# Patient Record
Sex: FEMALE
[De-identification: names, ages, dates, MRNs, and addresses within clinical notes are randomized per-mention and may not be internally consistent; named-entity substitution may affect disease eponyms.]

---

## 2017-05-03 ENCOUNTER — TRANSCRIPTION ENCOUNTER (OUTPATIENT)
Age: 82
End: 2017-05-03

## 2017-05-11 ENCOUNTER — APPOINTMENT (OUTPATIENT)
Dept: SURGERY | Facility: CLINIC | Age: 82
End: 2017-05-11

## 2017-05-11 VITALS
TEMPERATURE: 97.8 F | OXYGEN SATURATION: 96 % | HEIGHT: 63 IN | SYSTOLIC BLOOD PRESSURE: 136 MMHG | DIASTOLIC BLOOD PRESSURE: 68 MMHG | WEIGHT: 163 LBS | BODY MASS INDEX: 28.88 KG/M2 | HEART RATE: 50 BPM

## 2017-05-11 PROBLEM — Z00.00 ENCOUNTER FOR PREVENTIVE HEALTH EXAMINATION: Status: ACTIVE | Noted: 2017-05-11

## 2017-05-25 ENCOUNTER — OUTPATIENT (OUTPATIENT)
Dept: OUTPATIENT SERVICES | Facility: HOSPITAL | Age: 82
LOS: 1 days | End: 2017-05-25
Payer: COMMERCIAL

## 2017-05-25 VITALS — HEIGHT: 63 IN | WEIGHT: 162.92 LBS

## 2017-05-25 DIAGNOSIS — C50.912 MALIGNANT NEOPLASM OF UNSPECIFIED SITE OF LEFT FEMALE BREAST: ICD-10-CM

## 2017-05-25 DIAGNOSIS — Z01.818 ENCOUNTER FOR OTHER PREPROCEDURAL EXAMINATION: ICD-10-CM

## 2017-05-25 DIAGNOSIS — Z98.42 CATARACT EXTRACTION STATUS, LEFT EYE: Chronic | ICD-10-CM

## 2017-05-25 PROCEDURE — 86900 BLOOD TYPING SEROLOGIC ABO: CPT

## 2017-05-25 PROCEDURE — G0463: CPT

## 2017-05-25 PROCEDURE — 86850 RBC ANTIBODY SCREEN: CPT

## 2017-05-25 PROCEDURE — 86901 BLOOD TYPING SEROLOGIC RH(D): CPT

## 2017-05-25 RX ORDER — SODIUM CHLORIDE 9 MG/ML
3 INJECTION INTRAMUSCULAR; INTRAVENOUS; SUBCUTANEOUS EVERY 8 HOURS
Qty: 0 | Refills: 0 | Status: DISCONTINUED | OUTPATIENT
Start: 2017-06-07 | End: 2017-06-07

## 2017-05-25 NOTE — H&P PST ADULT - HISTORY OF PRESENT ILLNESS
Pt presented with a H/o left breast tumor which was found by her PCP. She had a mammogram and a sonogram done and found she has malignant    tumor on the left on left breast. She is scheduled for modified radical mastectomy on 6/7/17

## 2017-05-25 NOTE — H&P PST ADULT - NSANTHOSAYNRD_GEN_A_CORE
No. SUJATHA screening performed.  STOP BANG Legend: 0-2 = LOW Risk; 3-4 = INTERMEDIATE Risk; 5-8 = HIGH Risk

## 2017-06-07 ENCOUNTER — INPATIENT (INPATIENT)
Facility: HOSPITAL | Age: 82
LOS: 0 days | Discharge: HOME HEALTH | DRG: 581 | End: 2017-06-08
Attending: SPECIALIST | Admitting: SPECIALIST
Payer: COMMERCIAL

## 2017-06-07 ENCOUNTER — TRANSCRIPTION ENCOUNTER (OUTPATIENT)
Age: 82
End: 2017-06-07

## 2017-06-07 ENCOUNTER — RESULT REVIEW (OUTPATIENT)
Age: 82
End: 2017-06-07

## 2017-06-07 VITALS
OXYGEN SATURATION: 97 % | TEMPERATURE: 98 F | HEART RATE: 61 BPM | DIASTOLIC BLOOD PRESSURE: 58 MMHG | HEIGHT: 63 IN | SYSTOLIC BLOOD PRESSURE: 107 MMHG | WEIGHT: 162.92 LBS | RESPIRATION RATE: 18 BRPM

## 2017-06-07 DIAGNOSIS — C50.912 MALIGNANT NEOPLASM OF UNSPECIFIED SITE OF LEFT FEMALE BREAST: ICD-10-CM

## 2017-06-07 DIAGNOSIS — E03.9 HYPOTHYROIDISM, UNSPECIFIED: ICD-10-CM

## 2017-06-07 DIAGNOSIS — I10 ESSENTIAL (PRIMARY) HYPERTENSION: ICD-10-CM

## 2017-06-07 DIAGNOSIS — Z98.42 CATARACT EXTRACTION STATUS, LEFT EYE: Chronic | ICD-10-CM

## 2017-06-07 DIAGNOSIS — E78.00 PURE HYPERCHOLESTEROLEMIA, UNSPECIFIED: ICD-10-CM

## 2017-06-07 PROCEDURE — 88309 TISSUE EXAM BY PATHOLOGIST: CPT | Mod: 26

## 2017-06-07 PROCEDURE — 19307 MAST MOD RAD: CPT | Mod: AS,LT

## 2017-06-07 RX ORDER — METOPROLOL TARTRATE 50 MG
25 TABLET ORAL
Qty: 0 | Refills: 0 | Status: DISCONTINUED | OUTPATIENT
Start: 2017-06-07 | End: 2017-06-08

## 2017-06-07 RX ORDER — MORPHINE SULFATE 50 MG/1
4 CAPSULE, EXTENDED RELEASE ORAL EVERY 4 HOURS
Qty: 0 | Refills: 0 | Status: DISCONTINUED | OUTPATIENT
Start: 2017-06-07 | End: 2017-06-08

## 2017-06-07 RX ORDER — SODIUM CHLORIDE 9 MG/ML
1000 INJECTION, SOLUTION INTRAVENOUS
Qty: 0 | Refills: 0 | Status: DISCONTINUED | OUTPATIENT
Start: 2017-06-07 | End: 2017-06-08

## 2017-06-07 RX ORDER — FENTANYL CITRATE 50 UG/ML
25 INJECTION INTRAVENOUS
Qty: 0 | Refills: 0 | Status: DISCONTINUED | OUTPATIENT
Start: 2017-06-07 | End: 2017-06-07

## 2017-06-07 RX ORDER — HEPARIN SODIUM 5000 [USP'U]/ML
5000 INJECTION INTRAVENOUS; SUBCUTANEOUS EVERY 8 HOURS
Qty: 0 | Refills: 0 | Status: DISCONTINUED | OUTPATIENT
Start: 2017-06-07 | End: 2017-06-08

## 2017-06-07 RX ORDER — ONDANSETRON 8 MG/1
4 TABLET, FILM COATED ORAL EVERY 6 HOURS
Qty: 0 | Refills: 0 | Status: DISCONTINUED | OUTPATIENT
Start: 2017-06-07 | End: 2017-06-08

## 2017-06-07 RX ORDER — ASPIRIN/CALCIUM CARB/MAGNESIUM 324 MG
81 TABLET ORAL DAILY
Qty: 0 | Refills: 0 | Status: DISCONTINUED | OUTPATIENT
Start: 2017-06-07 | End: 2017-06-08

## 2017-06-07 RX ORDER — CITALOPRAM 10 MG/1
10 TABLET, FILM COATED ORAL DAILY
Qty: 0 | Refills: 0 | Status: DISCONTINUED | OUTPATIENT
Start: 2017-06-07 | End: 2017-06-08

## 2017-06-07 RX ORDER — LEVOTHYROXINE SODIUM 125 MCG
25 TABLET ORAL DAILY
Qty: 0 | Refills: 0 | Status: DISCONTINUED | OUTPATIENT
Start: 2017-06-07 | End: 2017-06-08

## 2017-06-07 RX ORDER — ACETAMINOPHEN 500 MG
650 TABLET ORAL EVERY 6 HOURS
Qty: 0 | Refills: 0 | Status: DISCONTINUED | OUTPATIENT
Start: 2017-06-07 | End: 2017-06-08

## 2017-06-07 RX ORDER — ATORVASTATIN CALCIUM 80 MG/1
20 TABLET, FILM COATED ORAL AT BEDTIME
Qty: 0 | Refills: 0 | Status: DISCONTINUED | OUTPATIENT
Start: 2017-06-07 | End: 2017-06-08

## 2017-06-07 RX ADMIN — MORPHINE SULFATE 4 MILLIGRAM(S): 50 CAPSULE, EXTENDED RELEASE ORAL at 19:30

## 2017-06-07 RX ADMIN — SODIUM CHLORIDE 115 MILLILITER(S): 9 INJECTION, SOLUTION INTRAVENOUS at 14:21

## 2017-06-07 RX ADMIN — MORPHINE SULFATE 4 MILLIGRAM(S): 50 CAPSULE, EXTENDED RELEASE ORAL at 19:15

## 2017-06-07 RX ADMIN — FENTANYL CITRATE 25 MICROGRAM(S): 50 INJECTION INTRAVENOUS at 14:18

## 2017-06-07 RX ADMIN — FENTANYL CITRATE 25 MICROGRAM(S): 50 INJECTION INTRAVENOUS at 14:02

## 2017-06-07 NOTE — PROGRESS NOTE ADULT - SUBJECTIVE AND OBJECTIVE BOX
Surgery Post Op    Pt resting comfortably. No acute complaints.   Tolerating pain without meds.   Tolerating water, denies N/V  Voided post op.    Vital Signs Last 24 Hrs  T(C): 36.9, Max: 37.2 (06-07 @ 12:48)  T(F): 98.4, Max: 98.9 (06-07 @ 12:48)  HR: 68 (61 - 73)  BP: 108/78 (93/57 - 131/68)  BP(mean): 79 (68 - 87)  RR: 17 (16 - 20)  SpO2: 97% (95% - 100%)    Physical:  General: A&Ox3. NAD.  Chest: Dressing C/D/I. Nondistended, no fluctuance    I&O's Detail    I & Os for current day (as of 07 Jun 2017 18:56)  =============================================  IN:    Lactated Ringers IV Bolus: 715 ml    dextrose 5% + sodium chloride 0.45%.: 230 ml    Total IN: 945 ml  ---------------------------------------------  OUT:    Bulb: 25 ml dark serosanguinous    Bulb: 10 ml dark serosanguinous    Total OUT: 35 ml  ---------------------------------------------  Total NET: 910 ml

## 2017-06-08 VITALS
OXYGEN SATURATION: 95 % | DIASTOLIC BLOOD PRESSURE: 71 MMHG | SYSTOLIC BLOOD PRESSURE: 145 MMHG | RESPIRATION RATE: 16 BRPM | TEMPERATURE: 100 F | HEART RATE: 62 BPM

## 2017-06-08 LAB
BASOPHILS # BLD AUTO: 0 K/UL — SIGNIFICANT CHANGE UP (ref 0–0.2)
BASOPHILS NFR BLD AUTO: 0.5 % — SIGNIFICANT CHANGE UP (ref 0–2)
EOSINOPHIL # BLD AUTO: 0 K/UL — SIGNIFICANT CHANGE UP (ref 0–0.5)
EOSINOPHIL NFR BLD AUTO: 0.3 % — SIGNIFICANT CHANGE UP (ref 0–6)
HCT VFR BLD CALC: 34.8 % — SIGNIFICANT CHANGE UP (ref 34.5–45)
HGB BLD-MCNC: 11.4 G/DL — LOW (ref 11.5–15.5)
LYMPHOCYTES # BLD AUTO: 1.1 K/UL — SIGNIFICANT CHANGE UP (ref 1–3.3)
LYMPHOCYTES # BLD AUTO: 14.8 % — SIGNIFICANT CHANGE UP (ref 13–44)
MCHC RBC-ENTMCNC: 30.4 PG — SIGNIFICANT CHANGE UP (ref 27–34)
MCHC RBC-ENTMCNC: 32.6 GM/DL — SIGNIFICANT CHANGE UP (ref 32–36)
MCV RBC AUTO: 93.2 FL — SIGNIFICANT CHANGE UP (ref 80–100)
MONOCYTES # BLD AUTO: 0.5 K/UL — SIGNIFICANT CHANGE UP (ref 0–0.9)
MONOCYTES NFR BLD AUTO: 7.3 % — SIGNIFICANT CHANGE UP (ref 2–14)
NEUTROPHILS # BLD AUTO: 5.8 K/UL — SIGNIFICANT CHANGE UP (ref 1.8–7.4)
NEUTROPHILS NFR BLD AUTO: 77.2 % — HIGH (ref 43–77)
PLATELET # BLD AUTO: 204 K/UL — SIGNIFICANT CHANGE UP (ref 150–400)
RBC # BLD: 3.74 M/UL — LOW (ref 3.8–5.2)
RBC # FLD: 12.2 % — SIGNIFICANT CHANGE UP (ref 10.3–14.5)
WBC # BLD: 7.5 K/UL — SIGNIFICANT CHANGE UP (ref 3.8–10.5)
WBC # FLD AUTO: 7.5 K/UL — SIGNIFICANT CHANGE UP (ref 3.8–10.5)

## 2017-06-08 PROCEDURE — 88309 TISSUE EXAM BY PATHOLOGIST: CPT

## 2017-06-08 PROCEDURE — 86900 BLOOD TYPING SEROLOGIC ABO: CPT

## 2017-06-08 PROCEDURE — 85027 COMPLETE CBC AUTOMATED: CPT

## 2017-06-08 PROCEDURE — 86850 RBC ANTIBODY SCREEN: CPT

## 2017-06-08 PROCEDURE — 86901 BLOOD TYPING SEROLOGIC RH(D): CPT

## 2017-06-08 RX ORDER — ACETAMINOPHEN WITH CODEINE 300MG-30MG
1 TABLET ORAL
Qty: 20 | Refills: 0 | OUTPATIENT
Start: 2017-06-08

## 2017-06-08 RX ADMIN — CITALOPRAM 10 MILLIGRAM(S): 10 TABLET, FILM COATED ORAL at 12:18

## 2017-06-08 RX ADMIN — MORPHINE SULFATE 4 MILLIGRAM(S): 50 CAPSULE, EXTENDED RELEASE ORAL at 10:20

## 2017-06-08 RX ADMIN — HEPARIN SODIUM 5000 UNIT(S): 5000 INJECTION INTRAVENOUS; SUBCUTANEOUS at 13:13

## 2017-06-08 RX ADMIN — Medication 81 MILLIGRAM(S): at 12:18

## 2017-06-08 RX ADMIN — MORPHINE SULFATE 4 MILLIGRAM(S): 50 CAPSULE, EXTENDED RELEASE ORAL at 10:35

## 2017-06-08 RX ADMIN — Medication 25 MICROGRAM(S): at 06:04

## 2017-06-08 RX ADMIN — Medication 25 MILLIGRAM(S): at 06:04

## 2017-06-08 RX ADMIN — HEPARIN SODIUM 5000 UNIT(S): 5000 INJECTION INTRAVENOUS; SUBCUTANEOUS at 06:04

## 2017-06-08 NOTE — PROGRESS NOTE ADULT - PROBLEM SELECTOR PLAN 1
D/C planning
Pain control prn  incentive spirometry  DVT ppx  OOB  YENI drain care  d/c planning in AM
discharge home today with JPs  pain control prn  OOB

## 2017-06-08 NOTE — DISCHARGE NOTE ADULT - CARE PROVIDER_API CALL
Konstantin Box), Surgery  [address] [address] [place] [address]  Phone: [phone]  Fax: [phone]

## 2017-06-08 NOTE — DISCHARGE NOTE ADULT - PLAN OF CARE
wound healing Keep surgical bra on. Keep dressing dry. Sponge bath only. Empty YENI drains daily, recording amount each time. continue medication

## 2017-06-08 NOTE — DISCHARGE NOTE ADULT - CARE PLAN
Principal Discharge DX:	Breast cancer, left  Goal:	wound healing  Instructions for follow-up, activity and diet:	Keep surgical bra on. Keep dressing dry. Sponge bath only. Empty YENI drains daily, recording amount each time.  Secondary Diagnosis:	High blood pressure  Instructions for follow-up, activity and diet:	continue medication  Secondary Diagnosis:	High cholesterol  Instructions for follow-up, activity and diet:	continue medication  Secondary Diagnosis:	Hypothyroid  Instructions for follow-up, activity and diet:	continue medication

## 2017-06-08 NOTE — PROGRESS NOTE ADULT - SUBJECTIVE AND OBJECTIVE BOX
INTERVAL HPI/OVERNIGHT EVENTS:  Pt resting comfortably. No acute complaints.   Tolerating pain with meds.     MEDICATIONS  (STANDING):  heparin  Injectable 5000Unit(s) SubCutaneous every 8 hours  dextrose 5% + sodium chloride 0.45%. 1000milliLiter(s) IV Continuous <Continuous>  aspirin enteric coated 81milliGRAM(s) Oral daily  citalopram 10milliGRAM(s) Oral daily  atorvastatin 20milliGRAM(s) Oral at bedtime  metoprolol 25milliGRAM(s) Oral two times a day  levothyroxine 25MICROGram(s) Oral daily    MEDICATIONS  (PRN):  acetaminophen   Tablet 650milliGRAM(s) Oral every 6 hours PRN For Temp greater than 38 C (100.4 F)  oxyCODONE  5 mG/acetaminophen 325 mG 1Tablet(s) Oral every 4 hours PRN Moderate Pain  morphine  - Injectable 4milliGRAM(s) IV Push every 4 hours PRN Severe Pain  ondansetron Injectable 4milliGRAM(s) IV Push every 6 hours PRN Nausea      Vital Signs Last 24 Hrs  T(C): 37.3, Max: 37.3 (06-08 @ 06:46)  T(F): 99.2, Max: 99.2 (06-08 @ 06:46)  HR: 73 (61 - 73)  BP: 119/55 (93/57 - 131/68)  BP(mean): 79 (68 - 87)  RR: 16 (16 - 20)  SpO2: 99% (94% - 100%)    Physical:  General: A&Ox3. NAD.  Chest: Dressing C/D/I. No fluctuance or crepitus. NT    I&O's Detail    I & Os for current day (as of 08 Jun 2017 08:26)  =============================================  IN:    Lactated Ringers IV Bolus: 715 ml    dextrose 5% + sodium chloride 0.45%.: 230 ml    Total IN: 945 ml  ---------------------------------------------  OUT:    Bulb: 85 ml serosanguinous    Bulb: 40 ml serosanguinous    Total OUT: 125 ml  ---------------------------------------------  Total NET: 820 ml      LABS:                        11.4   7.5   )-----------( 204 ( 08 Jun 2017 07:06 )             34.8

## 2017-06-08 NOTE — DISCHARGE NOTE ADULT - HOSPITAL COURSE
85 y.o. F w/ PMH left breast cancer, HTN, hypothyroidism, hyperlipidemia underwent elective left modified radical mastectomy 6/8/17. 2 JPs inserted. Patient stable post operatively and discharged POD#1 with 2 JPs

## 2017-06-08 NOTE — DISCHARGE NOTE ADULT - PATIENT PORTAL LINK FT
“You can access the [facility] Patient Portal, offered by [facility], by registering with the following website: [email or web address]”

## 2017-06-08 NOTE — CONSULT NOTE ADULT - SUBJECTIVE AND OBJECTIVE BOX
Patient is a 85y old  Female who presents with a chief complaint of "I am going for a Left mastectomy" (08 Jun 2017 07:12)   S/P Left mastectomy for breast cancer. Never smoker.       INTERVAL HPI/OVERNIGHT EVENTS:  T(C): 37.3, Max: 37.3 (06-08 @ 06:46)  HR: 73 (61 - 73)  BP: 119/55 (93/57 - 131/68)  RR: 16 (16 - 20)  SpO2: 99% (94% - 100%)  Wt(kg): --  I&O's Summary    I & Os for current day (as of 08 Jun 2017 09:27)  =============================================  IN: 945 ml / OUT: 125 ml / NET: 820 ml      PAST MEDICAL & SURGICAL HISTORY:  Hypothyroid  High cholesterol  High blood pressure  History of cataract surgery, left  No significant past surgical history      SOCIAL HISTORY  Alcohol: Denied.  Tobacco: Never smoker.  Illicit substance use: Denied.      FAMILY HISTORY:      LABS:                        11.4   7.5   )-----------( 204      ( 08 Jun 2017 07:06 )               34.8               CAPILLARY BLOOD GLUCOSE            MEDICATIONS  (STANDING):  heparin  Injectable 5000Unit(s) SubCutaneous every 8 hours  dextrose 5% + sodium chloride 0.45%. 1000milliLiter(s) IV Continuous <Continuous>  aspirin enteric coated 81milliGRAM(s) Oral daily  citalopram 10milliGRAM(s) Oral daily  atorvastatin 20milliGRAM(s) Oral at bedtime  metoprolol 25milliGRAM(s) Oral two times a day  levothyroxine 25MICROGram(s) Oral daily    MEDICATIONS  (PRN):  acetaminophen   Tablet 650milliGRAM(s) Oral every 6 hours PRN For Temp greater than 38 C (100.4 F)  oxyCODONE  5 mG/acetaminophen 325 mG 1Tablet(s) Oral every 4 hours PRN Moderate Pain  morphine  - Injectable 4milliGRAM(s) IV Push every 4 hours PRN Severe Pain  ondansetron Injectable 4milliGRAM(s) IV Push every 6 hours PRN Nausea      REVIEW OF SYSTEMS:  CONSTITUTIONAL: No fever, weight loss, or fatigue  EYES: No eye pain, visual disturbances, or discharge  ENMT:  No difficulty hearing, tinnitus, vertigo; No sinus or throat pain  NECK: No pain or stiffness  RESPIRATORY: No cough, wheezing, chills or hemoptysis; No shortness of breath  CARDIOVASCULAR: No chest pain, palpitations, dizziness, or leg swelling  GASTROINTESTINAL: No abdominal or epigastric pain. No nausea, vomiting, or hematemesis; No diarrhea or constipation. No melena or hematochezia.  GENITOURINARY: No dysuria, frequency, hematuria, or incontinence  NEUROLOGICAL: No headaches, memory loss, loss of strength, numbness, or tremors  SKIN: No itching, burning, rashes, or lesions   LYMPH NODES: No enlarged glands  ENDOCRINE: No heat or cold intolerance; No hair loss  MUSCULOSKELETAL: No joint pain or swelling; No muscle, back, or extremity pain  PSYCHIATRIC: No depression, anxiety, mood swings, or difficulty sleeping  HEME/LYMPH: No easy bruising, or bleeding gums  ALLERY AND IMMUNOLOGIC: No hives or eczema    PHYSICAL EXAM:  GENERAL: NAD, well-groomed, well-developed  HEAD:  Atraumatic, Normocephalic  EYES: EOMI, PERRLA, conjunctiva and sclera clear  ENMT: No tonsillar erythema, exudates, or enlargement; Moist mucous membranes, Good dentition, No lesions  NECK: Supple, No JVD, Normal thyroid  NERVOUS SYSTEM:  Alert & Oriented X3, Good concentration; Motor Strength 5/5 B/L upper and lower extremities; DTRs 2+ intact and symmetric  CHEST/LUNG: Clear to percussion bilaterally; No rales, rhonchi, wheezing, or rubs  HEART: Regular rate and rhythm; No murmurs, rubs, or gallops  ABDOMEN: Soft, Nontender, Nondistended; Bowel sounds present  EXTREMITIES:  2+ Peripheral Pulses, No clubbing, cyanosis, or edema  LYMPH: No lymphadenopathy noted  SKIN: No rashes or lesions    RADIOLOGY & ADDITIONAL TESTS:        Imaging Personally Reviewed:  [ ] YES  [ ] NO    Consultant(s) Notes Reviewed:  [x ] YES  [ ] NO        Care Discussed with Consultants/Other Providers [ ] YES  [ ] NO Patient is a 85y old  Female who presents with a chief complaint of "I am going for a Left mastectomy" (08 Jun 2017 07:12)   S/P Left mastectomy for breast cancer. Never smoker. Awake, alert, comfortable in bed in NAD.      INTERVAL HPI/OVERNIGHT EVENTS:  T(C): 37.3, Max: 37.3 (06-08 @ 06:46)  HR: 73 (61 - 73)  BP: 119/55 (93/57 - 131/68)  RR: 16 (16 - 20)  SpO2: 99% (94% - 100%)  Wt(kg): --  I&O's Summary    I & Os for current day (as of 08 Jun 2017 09:27)  =============================================  IN: 945 ml / OUT: 125 ml / NET: 820 ml      PAST MEDICAL & SURGICAL HISTORY:  Hypothyroid  High cholesterol  High blood pressure  History of cataract surgery, left  No significant past surgical history      SOCIAL HISTORY  Alcohol: Denied.  Tobacco: Never smoker.  Illicit substance use: Denied.      FAMILY HISTORY:      LABS:                        11.4   7.5   )-----------( 204      ( 08 Jun 2017 07:06 )               34.8               CAPILLARY BLOOD GLUCOSE            MEDICATIONS  (STANDING):  heparin  Injectable 5000Unit(s) SubCutaneous every 8 hours  dextrose 5% + sodium chloride 0.45%. 1000milliLiter(s) IV Continuous <Continuous>  aspirin enteric coated 81milliGRAM(s) Oral daily  citalopram 10milliGRAM(s) Oral daily  atorvastatin 20milliGRAM(s) Oral at bedtime  metoprolol 25milliGRAM(s) Oral two times a day  levothyroxine 25MICROGram(s) Oral daily    MEDICATIONS  (PRN):  acetaminophen   Tablet 650milliGRAM(s) Oral every 6 hours PRN For Temp greater than 38 C (100.4 F)  oxyCODONE  5 mG/acetaminophen 325 mG 1Tablet(s) Oral every 4 hours PRN Moderate Pain  morphine  - Injectable 4milliGRAM(s) IV Push every 4 hours PRN Severe Pain  ondansetron Injectable 4milliGRAM(s) IV Push every 6 hours PRN Nausea      REVIEW OF SYSTEMS:  CONSTITUTIONAL: No fever, weight loss, or fatigue  EYES: No eye pain, visual disturbances, or discharge  ENMT:  No difficulty hearing, tinnitus, vertigo; No sinus or throat pain  NECK: No pain or stiffness  RESPIRATORY: No cough, wheezing, chills or hemoptysis; No shortness of breath  CARDIOVASCULAR: No chest pain, palpitations, dizziness, or leg swelling  GASTROINTESTINAL: No abdominal or epigastric pain. No nausea, vomiting, or hematemesis; No diarrhea or constipation. No melena or hematochezia.  GENITOURINARY: No dysuria, frequency, hematuria, or incontinence  NEUROLOGICAL: No headaches, memory loss, loss of strength, numbness, or tremors  SKIN: No itching, burning, rashes, or lesions   LYMPH NODES: No enlarged glands  ENDOCRINE: No heat or cold intolerance; No hair loss  MUSCULOSKELETAL: No joint pain or swelling; No muscle, back, or extremity pain  PSYCHIATRIC: No depression, anxiety, mood swings, or difficulty sleeping  HEME/LYMPH: No easy bruising, or bleeding gums  ALLERY AND IMMUNOLOGIC: No hives or eczema    PHYSICAL EXAM:  GENERAL: NAD, well-groomed, well-developed  HEAD:  Atraumatic, Normocephalic  EYES: EOMI, PERRLA, conjunctiva and sclera clear  ENMT: No tonsillar erythema, exudates, or enlargement; Moist mucous membranes, Good dentition, No lesions  NECK: Supple, No JVD, Normal thyroid  NERVOUS SYSTEM:  Alert & Oriented X3, Good concentration; Motor Strength 5/5 B/L upper and lower extremities; DTRs 2+ intact and symmetric  CHEST/LUNG: Clear to percussion bilaterally; No rales, rhonchi, wheezing, or rubs  HEART: Regular rate and rhythm; No murmurs, rubs, or gallops  ABDOMEN: Soft, Nontender, Nondistended; Bowel sounds present  EXTREMITIES:  2+ Peripheral Pulses, No clubbing, cyanosis, or edema  LYMPH: No lymphadenopathy noted  SKIN: Surgical dressing clean    RADIOLOGY & ADDITIONAL TESTS:        Imaging Personally Reviewed:  [ ] YES  [ ] NO    Consultant(s) Notes Reviewed:  [x ] YES  [ ] NO        Care Discussed with Consultants/Other Providers [ ] YES  [ ] NO

## 2017-06-08 NOTE — PROGRESS NOTE ADULT - SUBJECTIVE AND OBJECTIVE BOX
INTERVAL HPI/OVERNIGHT EVENTS:  Pt stable.   Tolerating diet.   flatus/BM.    Vital Signs Last 24 Hrs  T(C): 37.3, Max: 37.3 (06-08 @ 06:46)  T(F): 99.2, Max: 99.2 (06-08 @ 06:46)  HR: 73 (62 - 73)  BP: 119/55 (93/57 - 131/68)  BP(mean): 79 (68 - 87)  RR: 16 (16 - 20)  SpO2: 99% (94% - 100%)    Physical:  dressings intact, clean  JPs serosanguinous drainage  complaining of mild pain at surgery site    I&O's Summary    I & Os for current day (as of 08 Jun 2017 11:06)  =============================================  IN: 945 ml / OUT: 125 ml / NET: 820 ml      LABS:                        11.4   7.5   )-----------( 204      ( 08 Jun 2017 07:06 )             34.8

## 2017-06-08 NOTE — DISCHARGE NOTE ADULT - MEDICATION SUMMARY - MEDICATIONS TO TAKE
I will START or STAY ON the medications listed below when I get home from the hospital:    acetaminophen-codeine 300 mg-30 mg oral tablet  -- 1 tab(s) by mouth every 6 hours, As Needed -for moderate pain MDD:4  -- Caution federal law prohibits the transfer of this drug to any person other  than the person for whom it was prescribed.  May cause drowsiness.  Alcohol may intensify this effect.  Use care when operating dangerous machinery.  This product contains acetaminophen.  Do not use  with any other product containing acetaminophen to prevent possible liver damage.  Using more of this medication than prescribed may cause serious breathing problems.    -- Indication: For Pain    aspirin 81 mg oral tablet  -- 1 tab(s) by mouth once a day  -- Indication: For Prophylaxis    citalopram 10 mg oral tablet  -- 1 tab(s) by mouth once a day  -- Indication: For Depression    Lipitor 20 mg oral tablet  -- 1 tab(s) by mouth once a day  -- Indication: For Hyperlipidemia    metoprolol tartrate 25 mg oral tablet  -- 1 tab(s) by mouth 2 times a day  -- Indication: For HTN    Synthroid 25 mcg (0.025 mg) oral tablet  -- 1 tab(s) by mouth once a day  -- Indication: For Hypothyroidism    vitamin E dl-alpha 600 intl units oral capsule  --  by mouth   -- Indication: For Supplement

## 2017-06-09 LAB — SURGICAL PATHOLOGY FINAL REPORT - CH: SIGNIFICANT CHANGE UP

## 2017-06-12 DIAGNOSIS — E03.9 HYPOTHYROIDISM, UNSPECIFIED: ICD-10-CM

## 2017-06-12 DIAGNOSIS — E78.5 HYPERLIPIDEMIA, UNSPECIFIED: ICD-10-CM

## 2017-06-12 DIAGNOSIS — I10 ESSENTIAL (PRIMARY) HYPERTENSION: ICD-10-CM

## 2017-06-12 DIAGNOSIS — C50.912 MALIGNANT NEOPLASM OF UNSPECIFIED SITE OF LEFT FEMALE BREAST: ICD-10-CM

## 2017-06-29 ENCOUNTER — APPOINTMENT (OUTPATIENT)
Dept: SURGERY | Facility: CLINIC | Age: 82
End: 2017-06-29

## 2017-06-29 VITALS
BODY MASS INDEX: 29.06 KG/M2 | WEIGHT: 164 LBS | TEMPERATURE: 98.6 F | OXYGEN SATURATION: 97 % | SYSTOLIC BLOOD PRESSURE: 113 MMHG | HEART RATE: 64 BPM | DIASTOLIC BLOOD PRESSURE: 67 MMHG | HEIGHT: 63 IN

## 2017-09-21 ENCOUNTER — APPOINTMENT (OUTPATIENT)
Dept: SURGERY | Facility: CLINIC | Age: 82
End: 2017-09-21

## 2017-10-18 PROBLEM — F15.90 CAFFEINE USE: Status: ACTIVE | Noted: 2017-10-18

## 2017-10-18 PROBLEM — Z86.59 HISTORY OF DEPRESSION: Status: RESOLVED | Noted: 2017-05-11 | Resolved: 2017-10-18

## 2017-10-18 PROBLEM — Z85.3 HISTORY OF MALIGNANT NEOPLASM OF BREAST: Status: RESOLVED | Noted: 2017-10-18 | Resolved: 2017-10-18

## 2017-10-18 PROBLEM — Z86.79 HISTORY OF HYPERTENSION: Status: RESOLVED | Noted: 2017-05-11 | Resolved: 2017-10-18

## 2017-10-18 PROBLEM — Z86.39 HISTORY OF THYROID DISEASE: Status: RESOLVED | Noted: 2017-05-11 | Resolved: 2017-10-18

## 2017-10-19 ENCOUNTER — APPOINTMENT (OUTPATIENT)
Dept: SURGERY | Facility: CLINIC | Age: 82
End: 2017-10-19

## 2017-10-19 DIAGNOSIS — Z85.3 PERSONAL HISTORY OF MALIGNANT NEOPLASM OF BREAST: ICD-10-CM

## 2017-10-19 DIAGNOSIS — Z86.79 PERSONAL HISTORY OF OTHER DISEASES OF THE CIRCULATORY SYSTEM: ICD-10-CM

## 2017-10-19 DIAGNOSIS — Z86.59 PERSONAL HISTORY OF OTHER MENTAL AND BEHAVIORAL DISORDERS: ICD-10-CM

## 2017-10-19 DIAGNOSIS — Z86.39 PERSONAL HISTORY OF OTHER ENDOCRINE, NUTRITIONAL AND METABOLIC DISEASE: ICD-10-CM

## 2017-10-19 DIAGNOSIS — F15.90 OTHER STIMULANT USE, UNSPECIFIED, UNCOMPLICATED: ICD-10-CM

## 2017-11-10 RX ORDER — VITAMIN E 100 UNIT
0 CAPSULE ORAL
Qty: 0 | Refills: 0 | COMMUNITY

## 2017-11-10 RX ORDER — ATORVASTATIN CALCIUM 80 MG/1
1 TABLET, FILM COATED ORAL
Qty: 0 | Refills: 0 | COMMUNITY

## 2017-11-10 RX ORDER — CITALOPRAM 10 MG/1
1 TABLET, FILM COATED ORAL
Qty: 0 | Refills: 0 | COMMUNITY

## 2017-11-10 RX ORDER — LEVOTHYROXINE SODIUM 125 MCG
1 TABLET ORAL
Qty: 0 | Refills: 0 | COMMUNITY

## 2017-11-10 RX ORDER — METOPROLOL TARTRATE 50 MG
1 TABLET ORAL
Qty: 0 | Refills: 0 | COMMUNITY

## 2017-11-10 RX ORDER — ASPIRIN/CALCIUM CARB/MAGNESIUM 324 MG
1 TABLET ORAL
Qty: 0 | Refills: 0 | COMMUNITY

## 2018-02-28 NOTE — PROGRESS NOTE ADULT - PROBLEM/PLAN-1
DISPLAY PLAN FREE TEXT
no strength deficits were identified

## 2018-04-30 ENCOUNTER — APPOINTMENT (OUTPATIENT)
Dept: SURGERY | Facility: CLINIC | Age: 83
End: 2018-04-30
Payer: MEDICARE

## 2018-04-30 VITALS
DIASTOLIC BLOOD PRESSURE: 60 MMHG | BODY MASS INDEX: 27.29 KG/M2 | SYSTOLIC BLOOD PRESSURE: 103 MMHG | TEMPERATURE: 98.8 F | WEIGHT: 154 LBS | HEART RATE: 84 BPM | HEIGHT: 63 IN

## 2018-04-30 PROCEDURE — 99213 OFFICE O/P EST LOW 20 MIN: CPT

## 2024-08-13 NOTE — PATIENT PROFILE ADULT. - NUTRITION PROFILE
Subjective   Patient ID: Rosita Owens is a 62 y.o. female who presents for No chief complaint on file..    HPI follow-up visit and sick visit no chest pain no shortness of breath no side effect with medication but has had several episodes of nausea and vomiting she thought after eating a certain food but it is lasted for longer she had some diarrhea use the brat diet seem to help somewhat has some right-sided abdominal discomfort not much help with antacids    Review of Systems    Objective   There were no vitals taken for this visit.    Physical Exam vital signs noted alert and oriented x 3 NCAT no icterus no jaundice no pallor no JVD chest clear to auscultation CV regular rate and rhythm S1-S2 abdomen thin soft flat nontender normal active bowel sounds minimal tenderness towards the right side no flank tenderness LS spine nontender spinous process negative straight leg raise extremities no clubbing cyanosis or edema normal distal pulses    Assessment/Plan    impression right upper quadrant abdominal discomfort diarrhea resolving nausea vomiting  Plan does not feel she needs antacid or nausea medication okay to set up right upper quadrant ultrasound requisition made and advised on follow-up  Reviewed prior laboratory results  Discussed with diet and water consumption  Follow-up after that and with Dr. Zarate        no indicators present

## 2024-12-13 NOTE — H&P PST ADULT - ADMIT DATE
25-May-2017   Patient is a 79y old  Female who presents with a chief complaint of Left distal radius open reduction and internal fixation surgery (11 Dec 2024 20:41).    Patient seen and examined today. She denies significant worsening of her left UE and left hip pain. She denies chest pain, dyspnea, dizziness, vomiting, abdominal pain, dysuria, flank pain, fever. She is passing gas but has not had a bowel movement today.    Allergies    penicillin (Swelling)  sulfa drugs (Nausea)  codeine (Nausea)    MEDICATIONS  (STANDING):  acetaminophen     Tablet .. 1000 milliGRAM(s) Oral every 8 hours  carbidopa 25 milliGRAM(s) Oral <User Schedule>  carbidopa 23.75 mG/levodopa 95 mG ER 2 Capsule(s) Oral <User Schedule>  chlorhexidine 2% Cloths 1 Application(s) Topical <User Schedule>  Domperidone 10 milliGRAM(s) 1 Tablet(s) Oral <User Schedule>  enoxaparin Injectable 40 milliGRAM(s) SubCutaneous every 24 hours  HYDROmorphone  Injectable 0.4 milliGRAM(s) IV Push once  influenza  Vaccine (HIGH DOSE) 0.5 milliLiter(s) IntraMuscular once  polyethylene glycol 3350 17 Gram(s) Oral at bedtime  povidone iodine 10% Nasal Swab 1 Application(s) Both Nostrils once  senna 2 Tablet(s) Oral at bedtime  sodium chloride 0.9%. 1000 milliLiter(s) (150 mL/Hr) IV Continuous <Continuous>    MEDICATIONS  (PRN):  HYDROmorphone  Injectable 0.5 milliGRAM(s) IV Push every 6 hours PRN breakthrough pain  magnesium hydroxide Suspension 30 milliLiter(s) Oral daily PRN Constipation  ondansetron Injectable 4 milliGRAM(s) IV Push every 6 hours PRN Nausea and/or Vomiting  oxyCODONE    IR 10 milliGRAM(s) Oral every 4 hours PRN Severe Pain (7 - 10)  oxyCODONE    IR 5 milliGRAM(s) Oral every 4 hours PRN Moderate Pain (4 - 6)    Vital Signs Last 24 Hrs  T(C): 36.7 (12-13-24 @ 09:22), Max: 36.7 (12-13-24 @ 09:22)  T(F): 98.1 (12-13-24 @ 09:22), Max: 98.1 (12-13-24 @ 09:22)  HR: 92 (12-13-24 @ 09:22) (92 - 95)  BP: 159/78 (12-13-24 @ 09:22) (129/72 - 159/78)  BP(mean): --  RR: 17 (12-13-24 @ 09:22) (17 - 18)  SpO2: 98% (12-13-24 @ 09:22) (96% - 98%)    I&O's Summary    12 Dec 2024 07:01  -  13 Dec 2024 07:00  --------------------------------------------------------  IN: 1315 mL / OUT: 1200 mL / NET: 115 mL      Oxygen Saturation Index= Unable to calculate   [Based on FiO2 = Unknown, SpO2 = 98(12/13/2024 09:22), MAP = Unknown]    PHYSICAL EXAM:  GENERAL: NAD  HEAD: Normocephalic  EYES: EOMI, conjunctiva and sclera clear  ENMT: Moist mucous membranes  NECK: Supple, No JVD  NERVOUS SYSTEM:  Alert and oriented x 3  CHEST/LUNG: Clear to auscultation bilaterally  HEART: Regular rate and rhythm; Normal S1 and S2  ABDOMEN: Soft, Nontender, Bowel sounds present  EXTREMITIES:  immobilized left UE; 2+ Peripheral Pulses  PSYCH: Normal mood     Consultant(s) Notes Reviewed:  [x ] YES  [ ] NO  Care Discussed with Consultants/Other Providers [ x] YES  [ ] NO      Investigations:                        8.2    15.06 )-----------( 618      ( 13 Dec 2024 05:30 )             26.8     12-13    143  |  111[H]  |  23  ----------------------------<  103[H]  4.8   |  23  |  1.08    Ca    8.2[L]      13 Dec 2024 05:30

## 2025-06-24 NOTE — PATIENT PROFILE ADULT. - TYPE OF ADMISSION, PATIENT PROFILE
Refill approved as requested.  Please Remind patient of -  Health Maintenance Due   Topic Date Due   • Shingles Vaccine (1 of 2) 08/24/2012   • Respiratory Syncytial Virus (RSV) Vaccine 60+ (1 - 1-dose 75+ series) Never done   • COVID-19 Vaccine (4 - 2024-25 season) 09/01/2024   • Traditional Medicare- Medicare Wellness Visit  06/01/2025              Scheduled/Direct

## 2025-08-29 ENCOUNTER — APPOINTMENT (OUTPATIENT)
Dept: UROLOGY | Facility: CLINIC | Age: 89
End: 2025-08-29
Payer: MEDICARE

## 2025-08-29 VITALS
WEIGHT: 154 LBS | SYSTOLIC BLOOD PRESSURE: 142 MMHG | RESPIRATION RATE: 16 BRPM | BODY MASS INDEX: 27.29 KG/M2 | DIASTOLIC BLOOD PRESSURE: 75 MMHG | HEART RATE: 80 BPM | TEMPERATURE: 97.3 F | OXYGEN SATURATION: 92 % | HEIGHT: 63 IN

## 2025-08-29 DIAGNOSIS — N95.8 OTHER SPECIFIED MENOPAUSAL AND PERIMENOPAUSAL DISORDERS: ICD-10-CM

## 2025-08-29 DIAGNOSIS — N39.0 URINARY TRACT INFECTION, SITE NOT SPECIFIED: ICD-10-CM

## 2025-08-29 DIAGNOSIS — A49.9 URINARY TRACT INFECTION, SITE NOT SPECIFIED: ICD-10-CM

## 2025-08-29 DIAGNOSIS — N32.81 OVERACTIVE BLADDER: ICD-10-CM

## 2025-08-29 PROCEDURE — 99204 OFFICE O/P NEW MOD 45 MIN: CPT

## 2025-08-29 PROCEDURE — G2211 COMPLEX E/M VISIT ADD ON: CPT

## 2025-08-29 RX ORDER — MIRABEGRON 50 MG/1
50 TABLET, EXTENDED RELEASE ORAL
Qty: 30 | Refills: 3 | Status: ACTIVE | COMMUNITY
Start: 2025-08-29 | End: 1900-01-01

## 2025-08-29 RX ORDER — MIRABEGRON 50 MG/1
50 TABLET, EXTENDED RELEASE ORAL
Qty: 90 | Refills: 3 | Status: ACTIVE | COMMUNITY
Start: 2025-08-29 | End: 1900-01-01

## 2025-08-29 RX ORDER — ESTRADIOL 0.1 MG/G
0.1 CREAM VAGINAL
Qty: 1 | Refills: 9 | Status: ACTIVE | COMMUNITY
Start: 2025-08-29 | End: 1900-01-01

## 2025-08-30 PROBLEM — N39.0 BACTERIAL UTI: Status: ACTIVE | Noted: 2025-08-30

## 2025-08-30 RX ORDER — SULFAMETHOXAZOLE AND TRIMETHOPRIM 800; 160 MG/1; MG/1
800-160 TABLET ORAL TWICE DAILY
Qty: 6 | Refills: 0 | Status: ACTIVE | COMMUNITY
Start: 2025-08-30 | End: 1900-01-01

## 2025-09-02 LAB — BACTERIA UR CULT: ABNORMAL

## 2025-09-03 LAB
APPEARANCE: ABNORMAL
BACTERIA: ABNORMAL /HPF
BILIRUBIN URINE: NEGATIVE
BLOOD URINE: NEGATIVE
CAST: 0 /LPF
COLOR: YELLOW
EPITHELIAL CELLS: 6 /HPF
GLUCOSE QUALITATIVE U: NEGATIVE MG/DL
KETONES URINE: NEGATIVE MG/DL
LEUKOCYTE ESTERASE URINE: ABNORMAL
MICROSCOPIC-UA: NORMAL
NITRITE URINE: POSITIVE
PH URINE: 6
PROTEIN URINE: NORMAL MG/DL
RED BLOOD CELLS URINE: 1 /HPF
SPECIFIC GRAVITY URINE: 1.02
UROBILINOGEN URINE: 0.2 MG/DL
WHITE BLOOD CELLS URINE: 78 /HPF